# Patient Record
Sex: FEMALE
[De-identification: names, ages, dates, MRNs, and addresses within clinical notes are randomized per-mention and may not be internally consistent; named-entity substitution may affect disease eponyms.]

---

## 2019-01-09 ENCOUNTER — APPOINTMENT (OUTPATIENT)
Dept: FAMILY MEDICINE | Facility: CLINIC | Age: 54
End: 2019-01-09
Payer: COMMERCIAL

## 2019-01-09 ENCOUNTER — TRANSCRIPTION ENCOUNTER (OUTPATIENT)
Age: 54
End: 2019-01-09

## 2019-01-09 VITALS
WEIGHT: 146.31 LBS | HEIGHT: 66 IN | DIASTOLIC BLOOD PRESSURE: 76 MMHG | BODY MASS INDEX: 23.52 KG/M2 | SYSTOLIC BLOOD PRESSURE: 120 MMHG

## 2019-01-09 DIAGNOSIS — F17.200 NICOTINE DEPENDENCE, UNSPECIFIED, UNCOMPLICATED: ICD-10-CM

## 2019-01-09 DIAGNOSIS — M25.50 PAIN IN UNSPECIFIED JOINT: ICD-10-CM

## 2019-01-09 LAB — CYTOLOGY CVX/VAG DOC THIN PREP: NORMAL

## 2019-01-09 PROCEDURE — 93000 ELECTROCARDIOGRAM COMPLETE: CPT

## 2019-01-09 PROCEDURE — 99386 PREV VISIT NEW AGE 40-64: CPT | Mod: 25,Q5

## 2019-01-09 PROCEDURE — 36415 COLL VENOUS BLD VENIPUNCTURE: CPT

## 2019-01-09 NOTE — HISTORY OF PRESENT ILLNESS
[de-identified] : 53 year old female here to establish care and for a full physical examination. The patients complete medical, family and social history was documented and reviewed with the patient.  The patients medications were identified and also reviewed with the patient as well as any allergies to any medications. All active and previous medical problems were identified and discussed with the patient.  Any new problems were documented. \par \par Patient has never had a physical as an adult and now is complaining of fatigue, hair loss and anxiety\par  with acoustic neuroma\par

## 2019-01-09 NOTE — ASSESSMENT
[FreeTextEntry1] : Patient was counseled on healthy eating habits, on daily exercise and stress relief. All medications and allergies were reviewed with the patient and any adjustments necessary were made. Patient was counseled to try engage in an exercise activity for at least 30 minutes 3-4 times a week.  Patient was advised to eat a diet low in fat and carbohydrates and high in protein, with plenty of vegetables. Patient was advised to try and engage in relaxing activities whenever possible.\par The patients blood was draw in office and will be followed and assessed for any issues.  Patient was told to return to the office if any issues arise.  Unless otherwise stated, the patient is to continue all other medications as previously prescribed.\par \par joint pain/fatigue\par will do immune work up\par \par hair loss  - check bw, derm, biotin\par \par ekg - abnormal refer to cardio

## 2019-01-09 NOTE — HEALTH RISK ASSESSMENT
[Good] : ~his/her~  mood as  good [] : No [No falls in past year] : Patient reported no falls in the past year [0] : 2) Feeling down, depressed, or hopeless: Not at all (0) [de-identified] : 1 ppd [[id number]] : 0 [Patient reported mammogram was normal] : Patient reported mammogram was normal [Patient reported PAP Smear was normal] : Patient reported PAP Smear was normal [HIV Test offered] : HIV Test offered [Hepatitis C test offered] : Hepatitis C test offered [None] : None [With Significant Other] : lives with significant other [With Family] : lives with family [Employed] : employed [] :  [# Of Children ___] : has [unfilled] children [Fully functional (bathing, dressing, toileting, transferring, walking, feeding)] : Fully functional (bathing, dressing, toileting, transferring, walking, feeding) [Fully functional (using the telephone, shopping, preparing meals, housekeeping, doing laundry, using] : Fully functional and needs no help or supervision to perform IADLs (using the telephone, shopping, preparing meals, housekeeping, doing laundry, using transportation, managing medications and managing finances) [Smoke Detector] : smoke detector [Seat Belt] :  uses seat belt [MammogramDate] : 2015 [PapSmearDate] : 2017 [de-identified] :  [Discussed at today's visit] : Advance Directives Discussed at today's visit [FreeTextEntry4] : none

## 2020-02-26 ENCOUNTER — APPOINTMENT (OUTPATIENT)
Dept: FAMILY MEDICINE | Facility: CLINIC | Age: 55
End: 2020-02-26
Payer: COMMERCIAL

## 2020-02-26 VITALS
OXYGEN SATURATION: 98 % | BODY MASS INDEX: 23.3 KG/M2 | HEIGHT: 66 IN | RESPIRATION RATE: 16 BRPM | DIASTOLIC BLOOD PRESSURE: 66 MMHG | SYSTOLIC BLOOD PRESSURE: 94 MMHG | HEART RATE: 110 BPM | WEIGHT: 145 LBS

## 2020-02-26 PROCEDURE — 99213 OFFICE O/P EST LOW 20 MIN: CPT

## 2020-02-26 RX ORDER — MELOXICAM 7.5 MG/1
7.5 TABLET ORAL
Qty: 30 | Refills: 0 | Status: COMPLETED | COMMUNITY
Start: 2019-11-01

## 2020-02-26 NOTE — HISTORY OF PRESENT ILLNESS
[de-identified] : 54 year old female here with complaints of progressive sinus pain and pressure with green mucous. Patients active medications, allergies and issues were all reviewed with the patient at time of visit.\par \par

## 2020-02-26 NOTE — HEALTH RISK ASSESSMENT
[] : Yes [0] : 2) Feeling down, depressed, or hopeless: Not at all (0) [No falls in past year] : Patient reported no falls in the past year [de-identified] : 1 ppd [[id number]] : 0 [Good] : ~his/her~  mood as  good [Patient reported mammogram was normal] : Patient reported mammogram was normal [HIV Test offered] : HIV Test offered [Patient reported PAP Smear was normal] : Patient reported PAP Smear was normal [Hepatitis C test offered] : Hepatitis C test offered [None] : None [With Family] : lives with family [With Significant Other] : lives with significant other [] :  [Employed] : employed [# Of Children ___] : has [unfilled] children [Fully functional (bathing, dressing, toileting, transferring, walking, feeding)] : Fully functional (bathing, dressing, toileting, transferring, walking, feeding) [Seat Belt] :  uses seat belt [Smoke Detector] : smoke detector [Fully functional (using the telephone, shopping, preparing meals, housekeeping, doing laundry, using] : Fully functional and needs no help or supervision to perform IADLs (using the telephone, shopping, preparing meals, housekeeping, doing laundry, using transportation, managing medications and managing finances) [de-identified] :  [MammogramDate] : 2015 [PapSmearDate] : 2017 [Discussed at today's visit] : Advance Directives Discussed at today's visit [FreeTextEntry4] : none

## 2020-02-26 NOTE — REVIEW OF SYSTEMS
[Fatigue] : fatigue [Anxiety] : anxiety [Negative] : Neurological [FreeTextEntry4] : sinus pressure and pain

## 2020-02-26 NOTE — PHYSICAL EXAM
[Well Nourished] : well nourished [Clear to Auscultation] : lungs were clear to auscultation bilaterally [Regular Rhythm] : with a regular rhythm [Normal Gait] : normal gait [Normal S1, S2] : normal S1 and S2 [Normal Insight/Judgement] : insight and judgment were intact [Normal Affect] : the affect was normal [de-identified] : sinus tenderness

## 2020-04-28 ENCOUNTER — TRANSCRIPTION ENCOUNTER (OUTPATIENT)
Age: 55
End: 2020-04-28

## 2020-12-23 ENCOUNTER — APPOINTMENT (OUTPATIENT)
Dept: FAMILY MEDICINE | Facility: CLINIC | Age: 55
End: 2020-12-23
Payer: COMMERCIAL

## 2020-12-23 DIAGNOSIS — J01.90 ACUTE SINUSITIS, UNSPECIFIED: ICD-10-CM

## 2020-12-23 PROCEDURE — 99213 OFFICE O/P EST LOW 20 MIN: CPT | Mod: 95

## 2020-12-23 RX ORDER — AMOXICILLIN AND CLAVULANATE POTASSIUM 875; 125 MG/1; MG/1
875-125 TABLET, COATED ORAL
Qty: 20 | Refills: 0 | Status: DISCONTINUED | COMMUNITY
Start: 2020-02-26 | End: 2020-12-23

## 2020-12-23 NOTE — HEALTH RISK ASSESSMENT
[] : Yes [No falls in past year] : Patient reported no falls in the past year [0] : 2) Feeling down, depressed, or hopeless: Not at all (0) [de-identified] : 1 ppd [[id number]] : 0 [Good] : ~his/her~  mood as  good [Patient reported mammogram was normal] : Patient reported mammogram was normal [Patient reported PAP Smear was normal] : Patient reported PAP Smear was normal [HIV Test offered] : HIV Test offered [Hepatitis C test offered] : Hepatitis C test offered [None] : None [With Significant Other] : lives with significant other [With Family] : lives with family [Employed] : employed [] :  [# Of Children ___] : has [unfilled] children [Fully functional (bathing, dressing, toileting, transferring, walking, feeding)] : Fully functional (bathing, dressing, toileting, transferring, walking, feeding) [Fully functional (using the telephone, shopping, preparing meals, housekeeping, doing laundry, using] : Fully functional and needs no help or supervision to perform IADLs (using the telephone, shopping, preparing meals, housekeeping, doing laundry, using transportation, managing medications and managing finances) [Smoke Detector] : smoke detector [Seat Belt] :  uses seat belt [MammogramDate] : 2015 [PapSmearDate] : 2017 [de-identified] :  [Discussed at today's visit] : Advance Directives Discussed at today's visit [FreeTextEntry4] : none

## 2020-12-23 NOTE — PHYSICAL EXAM
[Well Nourished] : well nourished [Well Developed] : well developed [Clear to Auscultation] : lungs were clear to auscultation bilaterally [Regular Rhythm] : with a regular rhythm [Normal S1, S2] : normal S1 and S2 [Normal Gait] : normal gait [Normal Affect] : the affect was normal [Normal Insight/Judgement] : insight and judgment were intact [de-identified] : sinus tenderness

## 2020-12-23 NOTE — REVIEW OF SYSTEMS
[Fatigue] : fatigue [Anxiety] : anxiety [Negative] : Heme/Lymph [FreeTextEntry4] : sinus pressure and pain

## 2020-12-23 NOTE — ASSESSMENT
[FreeTextEntry1] : acute sinusitis\par Patient was advised to take all medications as prescribed and to finish any antibiotics in their entirety. Patient was told to rest, hydrate and treat symptoms as necessary. Patient was advised to return to this office or go directly to the ER if symptoms do not improve or if any worsening occurs.\par covid pcr sent\par medrol\par biaxin\par flonase\par

## 2020-12-23 NOTE — HISTORY OF PRESENT ILLNESS
[Home] : at home, [unfilled] , at the time of the visit. [Medical Office: ([facility])___] : at the medical office located in  [Verbal consent obtained from patient] : the patient, [unfilled] [de-identified] : 55 year old female here with complaints of progressive sinus pain and pressure with green mucous. Patients active medications, allergies and issues were all reviewed with the patient at time of visit.\par \par

## 2020-12-26 ENCOUNTER — TRANSCRIPTION ENCOUNTER (OUTPATIENT)
Age: 55
End: 2020-12-26

## 2020-12-26 LAB — SARS-COV-2 N GENE NPH QL NAA+PROBE: NOT DETECTED

## 2021-01-04 ENCOUNTER — TRANSCRIPTION ENCOUNTER (OUTPATIENT)
Age: 56
End: 2021-01-04

## 2021-03-29 ENCOUNTER — TRANSCRIPTION ENCOUNTER (OUTPATIENT)
Age: 56
End: 2021-03-29

## 2022-12-21 ENCOUNTER — NON-APPOINTMENT (OUTPATIENT)
Age: 57
End: 2022-12-21

## 2023-06-03 ENCOUNTER — NON-APPOINTMENT (OUTPATIENT)
Age: 58
End: 2023-06-03

## 2023-11-20 ENCOUNTER — NON-APPOINTMENT (OUTPATIENT)
Age: 58
End: 2023-11-20

## 2024-05-08 ENCOUNTER — APPOINTMENT (OUTPATIENT)
Dept: FAMILY MEDICINE | Facility: CLINIC | Age: 59
End: 2024-05-08
Payer: COMMERCIAL

## 2024-05-08 ENCOUNTER — NON-APPOINTMENT (OUTPATIENT)
Age: 59
End: 2024-05-08

## 2024-05-08 ENCOUNTER — LABORATORY RESULT (OUTPATIENT)
Age: 59
End: 2024-05-08

## 2024-05-08 VITALS
SYSTOLIC BLOOD PRESSURE: 121 MMHG | TEMPERATURE: 97.9 F | OXYGEN SATURATION: 97 % | RESPIRATION RATE: 16 BRPM | HEART RATE: 89 BPM | DIASTOLIC BLOOD PRESSURE: 84 MMHG | HEIGHT: 66 IN | WEIGHT: 153 LBS | BODY MASS INDEX: 24.59 KG/M2

## 2024-05-08 DIAGNOSIS — F17.200 NICOTINE DEPENDENCE, UNSPECIFIED, UNCOMPLICATED: ICD-10-CM

## 2024-05-08 DIAGNOSIS — R94.31 ABNORMAL ELECTROCARDIOGRAM [ECG] [EKG]: ICD-10-CM

## 2024-05-08 DIAGNOSIS — Z82.69 FAMILY HISTORY OF OTHER DISEASES OF THE MUSCULOSKELETAL SYSTEM AND CONNECTIVE TISSUE: ICD-10-CM

## 2024-05-08 DIAGNOSIS — R53.81 OTHER MALAISE: ICD-10-CM

## 2024-05-08 DIAGNOSIS — Z11.3 ENCOUNTER FOR SCREENING FOR INFECTIONS WITH A PREDOMINANTLY SEXUAL MODE OF TRANSMISSION: ICD-10-CM

## 2024-05-08 DIAGNOSIS — Z80.8 FAMILY HISTORY OF MALIGNANT NEOPLASM OF OTHER ORGANS OR SYSTEMS: ICD-10-CM

## 2024-05-08 DIAGNOSIS — Z00.00 ENCOUNTER FOR GENERAL ADULT MEDICAL EXAMINATION W/OUT ABNORMAL FINDINGS: ICD-10-CM

## 2024-05-08 DIAGNOSIS — R53.83 OTHER MALAISE: ICD-10-CM

## 2024-05-08 DIAGNOSIS — Z83.79 FAMILY HISTORY OF OTHER DISEASES OF THE DIGESTIVE SYSTEM: ICD-10-CM

## 2024-05-08 DIAGNOSIS — R79.89 OTHER SPECIFIED ABNORMAL FINDINGS OF BLOOD CHEMISTRY: ICD-10-CM

## 2024-05-08 PROCEDURE — 99386 PREV VISIT NEW AGE 40-64: CPT

## 2024-05-08 PROCEDURE — 93000 ELECTROCARDIOGRAM COMPLETE: CPT

## 2024-05-08 PROCEDURE — 36415 COLL VENOUS BLD VENIPUNCTURE: CPT

## 2024-05-08 RX ORDER — METHYLPREDNISOLONE 4 MG/1
4 TABLET ORAL
Qty: 1 | Refills: 0 | Status: DISCONTINUED | COMMUNITY
Start: 2020-02-26 | End: 2024-05-08

## 2024-05-08 RX ORDER — CERTOLIZUMAB PEGOL 200 MG/ML
2 X 200 INJECTION, SOLUTION SUBCUTANEOUS
Refills: 0 | Status: ACTIVE | COMMUNITY

## 2024-05-08 RX ORDER — FLUTICASONE PROPIONATE 50 UG/1
50 SPRAY, METERED NASAL TWICE DAILY
Qty: 1 | Refills: 5 | Status: DISCONTINUED | COMMUNITY
Start: 2020-02-26 | End: 2024-05-08

## 2024-05-08 RX ORDER — FEXOFENADINE HCL 60 MG
TABLET ORAL
Refills: 0 | Status: DISCONTINUED | COMMUNITY
End: 2024-05-08

## 2024-05-08 RX ORDER — CLARITHROMYCIN 500 MG/1
500 TABLET, FILM COATED ORAL TWICE DAILY
Qty: 20 | Refills: 0 | Status: DISCONTINUED | COMMUNITY
Start: 2020-12-23 | End: 2024-05-08

## 2024-05-08 NOTE — HEALTH RISK ASSESSMENT
[Yes] : Yes [Monthly or less (1 pt)] : Monthly or less (1 point) [HIV Test offered] : HIV Test offered [Hepatitis C test offered] : Hepatitis C test offered [# of Members in Household ___] :  household currently consist of [unfilled] member(s) [Employed] : employed [] :  [# Of Children ___] : has [unfilled] children [Current] : Current [de-identified] : rheum  [de-identified] : gardening  [de-identified] : varied diet  [de-identified] : , daughter  [FreeTextEntry2] : Hybrid-  [FreeTextEntry3] : 16 daughter  [de-identified] : Eye exam last year [de-identified] : few years  [de-identified] : less than half a pack a day; started consistent in 20s. not ready to quit.  Chest X-ray; at one point was pack a day

## 2024-05-08 NOTE — PLAN
[FreeTextEntry1] : HM-willing to do cologuard, needs mammogram.  Interested in doing lung cancer CT. Needs GYN.   EKG sinus-short DC-advising Cardiology evaluation.  Referral placed.  Hx of Low Vit D-recheck.  Following with rheum. Discussed eligible for shingrix vaccine.  Will adjust meds based on labs.  Patient expressed understanding of plan.

## 2024-05-08 NOTE — HISTORY OF PRESENT ILLNESS
[FreeTextEntry1] : Ms. DIAS presents for annual physical. [de-identified] : Ms. DIAS presents for annual physical. 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 Dr. Williamson-started on Cimzia-every 2 weeks. Seeing in two weeks. Has prednisone for joint pain prn.   Taking Zyrtec-  Vitamin D3 2000IU Mushroom supplement  Saw GYN few years ago.   Wants to do cologuard  Family Hx:  Maternal Uncle had Bone Cancer- Grandmother-cancer-not sure what time -Brother had Wegener's Granulomatosis-Kidney Failure-COVID; 40  Cousin-Lupus Niece-Crohn's disease Sister-scleroderma RA in family Children-1 daughter

## 2024-05-08 NOTE — PHYSICAL EXAM
----- Message from GRACY Rutledge sent at 10/9/2017 12:56 PM CDT -----  PAP showed some squamous cellular changes but is of undetermined significance with negative HPV. Recommend repeat PAP in 1 year.   [No Acute Distress] : no acute distress [Well-Appearing] : well-appearing [Normal Sclera/Conjunctiva] : normal sclera/conjunctiva [PERRL] : pupils equal round and reactive to light [Normal Oropharynx] : the oropharynx was normal [Normal TMs] : both tympanic membranes were normal [No Lymphadenopathy] : no lymphadenopathy [Thyroid Normal, No Nodules] : the thyroid was normal and there were no nodules present [No Edema] : there was no peripheral edema [No Extremity Clubbing/Cyanosis] : no extremity clubbing/cyanosis [Normal] : affect was normal and insight and judgment were intact

## 2024-05-08 NOTE — REVIEW OF SYSTEMS
[Postnasal Drip] : postnasal drip [Fever] : no fever [Chills] : no chills [Earache] : no earache [Nasal Discharge] : no nasal discharge [Sore Throat] : no sore throat [Chest Pain] : no chest pain [Palpitations] : no palpitations [Shortness Of Breath] : no shortness of breath [Wheezing] : no wheezing [Cough] : no cough [Constipation] : no constipation [Diarrhea] : diarrhea [Vomiting] : no vomiting [Heartburn] : no heartburn [Dysuria] : no dysuria [Headache] : no headache [Dizziness] : no dizziness [FreeTextEntry4] : after flying-left ear-sinus congestion [FreeTextEntry5] : used to get palpitations  [de-identified] : 8 hours of sleep per night; anxiety

## 2024-05-10 LAB
25(OH)D3 SERPL-MCNC: 36.6 NG/ML
ALBUMIN SERPL ELPH-MCNC: 4.2 G/DL
ALP BLD-CCNC: 84 U/L
ALT SERPL-CCNC: 26 U/L
ANION GAP SERPL CALC-SCNC: 14 MMOL/L
AST SERPL-CCNC: 21 U/L
BASOPHILS # BLD AUTO: 0.08 K/UL
BASOPHILS NFR BLD AUTO: 0.8 %
BILIRUB SERPL-MCNC: <0.2 MG/DL
BUN SERPL-MCNC: 13 MG/DL
C TRACH RRNA SPEC QL NAA+PROBE: NOT DETECTED
CALCIUM SERPL-MCNC: 10.1 MG/DL
CHLORIDE SERPL-SCNC: 104 MMOL/L
CHOLEST SERPL-MCNC: 267 MG/DL
CO2 SERPL-SCNC: 24 MMOL/L
CREAT SERPL-MCNC: 0.91 MG/DL
EGFR: 74 ML/MIN/1.73M2
EOSINOPHIL # BLD AUTO: 0.3 K/UL
EOSINOPHIL NFR BLD AUTO: 2.9 %
ESTIMATED AVERAGE GLUCOSE: 126 MG/DL
FOLATE SERPL-MCNC: 12.1 NG/ML
GLUCOSE SERPL-MCNC: 88 MG/DL
HBA1C MFR BLD HPLC: 6 %
HCT VFR BLD CALC: 38.2 %
HCV AB SER QL: NONREACTIVE
HCV S/CO RATIO: 0.15 S/CO
HDLC SERPL-MCNC: 48 MG/DL
HGB BLD-MCNC: 12.1 G/DL
HIV1+2 AB SPEC QL IA.RAPID: NONREACTIVE
IMM GRANULOCYTES NFR BLD AUTO: 0.2 %
LDLC SERPL CALC-MCNC: 171 MG/DL
LYMPHOCYTES # BLD AUTO: 4.45 K/UL
LYMPHOCYTES NFR BLD AUTO: 42.9 %
MAN DIFF?: NORMAL
MCHC RBC-ENTMCNC: 26.8 PG
MCHC RBC-ENTMCNC: 31.7 GM/DL
MCV RBC AUTO: 84.5 FL
MONOCYTES # BLD AUTO: 1.02 K/UL
MONOCYTES NFR BLD AUTO: 9.8 %
N GONORRHOEA RRNA SPEC QL NAA+PROBE: NOT DETECTED
NEUTROPHILS # BLD AUTO: 4.5 K/UL
NEUTROPHILS NFR BLD AUTO: 43.4 %
NONHDLC SERPL-MCNC: 219 MG/DL
PLATELET # BLD AUTO: 393 K/UL
POTASSIUM SERPL-SCNC: 4.9 MMOL/L
PROT SERPL-MCNC: 7.5 G/DL
RBC # BLD: 4.52 M/UL
RBC # FLD: 15.6 %
SODIUM SERPL-SCNC: 142 MMOL/L
SOURCE AMPLIFICATION: NORMAL
T PALLIDUM AB SER QL IA: NEGATIVE
TRIGL SERPL-MCNC: 252 MG/DL
TSH SERPL-ACNC: 0.84 UIU/ML
VIT B12 SERPL-MCNC: 691 PG/ML
WBC # FLD AUTO: 10.37 K/UL

## 2024-05-17 ENCOUNTER — NON-APPOINTMENT (OUTPATIENT)
Age: 59
End: 2024-05-17

## 2024-05-17 VITALS — WEIGHT: 153 LBS | HEIGHT: 66 IN | BODY MASS INDEX: 24.59 KG/M2

## 2024-05-17 DIAGNOSIS — F17.210 NICOTINE DEPENDENCE, CIGARETTES, UNCOMPLICATED: ICD-10-CM

## 2024-05-17 NOTE — HISTORY OF PRESENT ILLNESS
[Current] : Current [>=20 Pack-Years] : Twenty pack years or greater smoking history: Yes [TextBox_13] :  Referred by Dr. Sergio Geiger.   Ms. ROJAS DIAS  is a 58 year old woman with a history of Tobacco depndence.   She  was seen in the office by Dr. Geiger  for review of eligibility for, as well as, discussion of Low-Dose CT lung cancer screening program. Over the telephone today we reviewed and confirmed that the patient meets screening eligibility criteria: -Age: 58 year  Smoking status:  -Current smoker -Number of pack(s) per day: 1 -Number of years smoked: 44 -Number of pack years smokin   Ms. DIAS denies any signs or symptoms of lung cancer including new cough, change in cough, hemoptysis and unintentional weight loss.   Ms. DIAS denies any personal history of lung cancer. No lung cancer in a 1st degree relative. Denies any history of lung disease. Denies any history of occupational exposures  [PacksperYear] : 44

## 2024-06-15 ENCOUNTER — APPOINTMENT (OUTPATIENT)
Dept: CT IMAGING | Facility: CLINIC | Age: 59
End: 2024-06-15
Payer: COMMERCIAL

## 2024-06-15 ENCOUNTER — OUTPATIENT (OUTPATIENT)
Dept: OUTPATIENT SERVICES | Facility: HOSPITAL | Age: 59
LOS: 1 days | End: 2024-06-15
Payer: COMMERCIAL

## 2024-06-15 DIAGNOSIS — F17.200 NICOTINE DEPENDENCE, UNSPECIFIED, UNCOMPLICATED: ICD-10-CM

## 2024-06-15 PROCEDURE — 71271 CT THORAX LUNG CANCER SCR C-: CPT | Mod: 26

## 2024-06-15 PROCEDURE — 71271 CT THORAX LUNG CANCER SCR C-: CPT

## 2024-06-19 ENCOUNTER — TRANSCRIPTION ENCOUNTER (OUTPATIENT)
Age: 59
End: 2024-06-19